# Patient Record
(demographics unavailable — no encounter records)

---

## 2025-07-18 NOTE — HISTORY OF PRESENT ILLNESS
[FreeTextEntry1] : HPI-  [place] stool score - Colon Cancer Screening - Hepatitis C Screening -   Referred by -   PMHx - PSHx -  Rx - Supplements/herbs/OTC -  A/C or NSAIDs? -  FMHx - Allergies - EtOH - Smoking -  Drugs -  Diet -  Labs/Stool Tests - Breath Tests -  Imaging -  EGD -  Colonoscopy -

## 2025-07-29 NOTE — HISTORY OF PRESENT ILLNESS
[FreeTextEntry1] : HPI-  BMI (2023) - 16.63  Referred by -   PMHx - PSHx -  Rx - Supplements/herbs/OTC -  A/C or NSAIDs? -  FMHx - Allergies - NKDA EtOH - Smoking -  Drugs -  Diet -  Labs/Stool Tests - Breath Tests -  Imaging -  EGD -  Colonoscopy -